# Patient Record
Sex: FEMALE
[De-identification: names, ages, dates, MRNs, and addresses within clinical notes are randomized per-mention and may not be internally consistent; named-entity substitution may affect disease eponyms.]

---

## 2019-11-27 PROBLEM — Z00.00 ENCOUNTER FOR PREVENTIVE HEALTH EXAMINATION: Status: ACTIVE | Noted: 2019-11-27

## 2019-12-06 ENCOUNTER — APPOINTMENT (OUTPATIENT)
Dept: GYNECOLOGIC ONCOLOGY | Facility: CLINIC | Age: 49
End: 2019-12-06
Payer: COMMERCIAL

## 2019-12-06 VITALS
DIASTOLIC BLOOD PRESSURE: 74 MMHG | OXYGEN SATURATION: 98 % | HEART RATE: 95 BPM | WEIGHT: 190 LBS | RESPIRATION RATE: 16 BRPM | SYSTOLIC BLOOD PRESSURE: 157 MMHG | HEIGHT: 64 IN | BODY MASS INDEX: 32.44 KG/M2

## 2019-12-06 DIAGNOSIS — N93.9 ABNORMAL UTERINE AND VAGINAL BLEEDING, UNSPECIFIED: ICD-10-CM

## 2019-12-06 DIAGNOSIS — Z82.49 FAMILY HISTORY OF ISCHEMIC HEART DISEASE AND OTHER DISEASES OF THE CIRCULATORY SYSTEM: ICD-10-CM

## 2019-12-06 PROCEDURE — 76857 US EXAM PELVIC LIMITED: CPT | Mod: 59

## 2019-12-06 PROCEDURE — 99245 OFF/OP CONSLTJ NEW/EST HI 55: CPT | Mod: 25

## 2019-12-06 PROCEDURE — 76830 TRANSVAGINAL US NON-OB: CPT | Mod: 59

## 2019-12-06 NOTE — PHYSICAL EXAM
[de-identified] : Patient was interviewed and examined with chaperone present. Name of chaperone: Kiki Wayne  [Normal] : Bimanual Exam: Normal

## 2019-12-06 NOTE — CHIEF COMPLAINT
[FreeTextEntry1] : [facility]\par \par [facility] [phone] at [address] [place] [id number]\par

## 2019-12-06 NOTE — END OF VISIT
[FreeTextEntry3] : Written by Kiki CABA, acting as a scribe for Dr. Heath Nunez.\par This note accurately reflects the work and decisions made by me.\par

## 2019-12-06 NOTE — ASSESSMENT
[FreeTextEntry1] : Discussed with patient her treatment options of an ablation vs hysterectomy vs conservative management. Pt desires definitive treatment. Will obtain an MRI pelvis for further evaluation and to r/o adenomyosis which if present will need a hysterectomy. If  adenomyosis is not seen, will discuss a possible ablation.

## 2019-12-09 ENCOUNTER — RESULT REVIEW (OUTPATIENT)
Age: 49
End: 2019-12-09

## 2019-12-09 LAB — HPV HIGH+LOW RISK DNA PNL CVX: NOT DETECTED

## 2019-12-12 LAB — CYTOLOGY CVX/VAG DOC THIN PREP: NORMAL

## 2019-12-18 ENCOUNTER — OTHER (OUTPATIENT)
Age: 49
End: 2019-12-18

## 2019-12-23 ENCOUNTER — APPOINTMENT (OUTPATIENT)
Dept: GYNECOLOGIC ONCOLOGY | Facility: CLINIC | Age: 49
End: 2019-12-23
Payer: COMMERCIAL

## 2019-12-23 DIAGNOSIS — D25.9 LEIOMYOMA OF UTERUS, UNSPECIFIED: ICD-10-CM

## 2019-12-23 PROCEDURE — 99213 OFFICE O/P EST LOW 20 MIN: CPT

## 2020-01-09 PROBLEM — D25.9 LEIOMYOMA OF UTERUS: Status: ACTIVE | Noted: 2019-12-06

## 2020-01-09 NOTE — PHYSICAL EXAM
[Normal] : No focal neurologic defects observed [de-identified] : Kadie Oliveira MA was present the entire time of discussion

## 2020-01-09 NOTE — REASON FOR VISIT
[FreeTextEntry1] : [facility]\par \par [facility] [phone] at [address] [place] [id number]\par

## 2020-01-09 NOTE — HISTORY OF PRESENT ILLNESS
[FreeTextEntry1] : 48 y/o with endometriosis and PCOS began having mid cycle bleeding and abnormal heavy bleeding that began earlier this year. Patient presented on 12/06/19 for a consultation. Ultrasound from my office showed a left ovarian dominant follicle and a small fundal fibroid. I performed a cervical pap which was negative, HPV not detected. I discussed with her my suspicion for adenomyosis. I ordered a MRI pelvis to evaluate. If MRI consistent with adenomyosis will plan to discuss hysterectomy, if adenomyosis is not seen, will discuss possible ablation. \par \par MRI from 12/09/19 showed 2.2x1.9 cm subserosal fibroid in the right side of the uterine body. \par \par Patient states her last menstrual cycle was normal.

## 2020-01-09 NOTE — END OF VISIT
[FreeTextEntry3] : Written by Kadie Oliveira, acting as a scribe for Dr. Heath Nunez.\par This note accurately reflects the work and decisions made by me\par

## 2020-04-15 ENCOUNTER — APPOINTMENT (OUTPATIENT)
Dept: GYNECOLOGIC ONCOLOGY | Facility: CLINIC | Age: 50
End: 2020-04-15

## 2020-11-23 NOTE — HISTORY OF PRESENT ILLNESS
[FreeTextEntry1] : This 48yo , 3 c-sections, LMP 11/15/19 with history of endometriosis and PCOS began having mid cycle bleeding and abnormal, heavy periods earlier this year. Her last menses lasted 15 days with passage of large clots. She reports ovulation pains but otherwise denies abnormal pelvic pain. SHG and endometrial biopsy were attempted but patient could not tolerate the procedure. Pelvic sonogram on 19 revealed an echogenic endometrium measuring 1.47cm and a fibroid. She is overdue for her pap smear and mammogram.   \par   \par Pap smear-over 3 years ago, denies abnormal pap smears or HPV \par Mammogram-over 5 years ago\par Colonoscopy-never\par 
done
No